# Patient Record
Sex: MALE | Race: WHITE | NOT HISPANIC OR LATINO | URBAN - METROPOLITAN AREA
[De-identification: names, ages, dates, MRNs, and addresses within clinical notes are randomized per-mention and may not be internally consistent; named-entity substitution may affect disease eponyms.]

---

## 2017-09-05 ENCOUNTER — OUTPATIENT (OUTPATIENT)
Dept: OUTPATIENT SERVICES | Facility: HOSPITAL | Age: 42
LOS: 1 days | End: 2017-09-05
Payer: OTHER MISCELLANEOUS

## 2017-09-05 VITALS
SYSTOLIC BLOOD PRESSURE: 140 MMHG | TEMPERATURE: 98 F | WEIGHT: 250 LBS | HEIGHT: 71 IN | RESPIRATION RATE: 16 BRPM | HEART RATE: 60 BPM | DIASTOLIC BLOOD PRESSURE: 100 MMHG

## 2017-09-05 DIAGNOSIS — G47.33 OBSTRUCTIVE SLEEP APNEA (ADULT) (PEDIATRIC): ICD-10-CM

## 2017-09-05 DIAGNOSIS — M25.9 JOINT DISORDER, UNSPECIFIED: Chronic | ICD-10-CM

## 2017-09-05 DIAGNOSIS — S83.249A OTHER TEAR OF MEDIAL MENISCUS, CURRENT INJURY, UNSPECIFIED KNEE, INITIAL ENCOUNTER: ICD-10-CM

## 2017-09-05 DIAGNOSIS — R03.0 ELEVATED BLOOD-PRESSURE READING, WITHOUT DIAGNOSIS OF HYPERTENSION: ICD-10-CM

## 2017-09-05 LAB
BUN SERPL-MCNC: 13 MG/DL — SIGNIFICANT CHANGE UP (ref 7–23)
CALCIUM SERPL-MCNC: 9.3 MG/DL — SIGNIFICANT CHANGE UP (ref 8.4–10.5)
CHLORIDE SERPL-SCNC: 102 MMOL/L — SIGNIFICANT CHANGE UP (ref 98–107)
CO2 SERPL-SCNC: 30 MMOL/L — SIGNIFICANT CHANGE UP (ref 22–31)
CREAT SERPL-MCNC: 0.87 MG/DL — SIGNIFICANT CHANGE UP (ref 0.5–1.3)
GLUCOSE SERPL-MCNC: 89 MG/DL — SIGNIFICANT CHANGE UP (ref 70–99)
HCT VFR BLD CALC: 44.2 % — SIGNIFICANT CHANGE UP (ref 39–50)
HGB BLD-MCNC: 14.7 G/DL — SIGNIFICANT CHANGE UP (ref 13–17)
MCHC RBC-ENTMCNC: 28.1 PG — SIGNIFICANT CHANGE UP (ref 27–34)
MCHC RBC-ENTMCNC: 33.3 % — SIGNIFICANT CHANGE UP (ref 32–36)
MCV RBC AUTO: 84.4 FL — SIGNIFICANT CHANGE UP (ref 80–100)
NRBC # FLD: 0 — SIGNIFICANT CHANGE UP
PLATELET # BLD AUTO: 171 K/UL — SIGNIFICANT CHANGE UP (ref 150–400)
PMV BLD: 12.3 FL — SIGNIFICANT CHANGE UP (ref 7–13)
POTASSIUM SERPL-MCNC: 4.5 MMOL/L — SIGNIFICANT CHANGE UP (ref 3.5–5.3)
POTASSIUM SERPL-SCNC: 4.5 MMOL/L — SIGNIFICANT CHANGE UP (ref 3.5–5.3)
RBC # BLD: 5.24 M/UL — SIGNIFICANT CHANGE UP (ref 4.2–5.8)
RBC # FLD: 12.7 % — SIGNIFICANT CHANGE UP (ref 10.3–14.5)
SODIUM SERPL-SCNC: 141 MMOL/L — SIGNIFICANT CHANGE UP (ref 135–145)
WBC # BLD: 5.96 K/UL — SIGNIFICANT CHANGE UP (ref 3.8–10.5)
WBC # FLD AUTO: 5.96 K/UL — SIGNIFICANT CHANGE UP (ref 3.8–10.5)

## 2017-09-05 PROCEDURE — 93010 ELECTROCARDIOGRAM REPORT: CPT

## 2017-09-05 NOTE — H&P PST ADULT - NEGATIVE ALLERGY TYPES
no outdoor environmental allergies/no indoor environmental allergies/no reactions to medicines/no reactions to animals/no reactions to food

## 2017-09-05 NOTE — H&P PST ADULT - HISTORY OF PRESENT ILLNESS
42yr 42yr old male with preop dx of left knee medial meniscus tear presents to have [facility] eval for Left knee arthroscopy scheduled on 9/12/2017. Patient states in July 2017 he tripped at work and twisted his leg and injured his left knee. Patient had MRI and xray of left knee and was seen by Dr Goins and then scheduled for the procedure.

## 2017-09-05 NOTE — H&P PST ADULT - NSANTHOSAYNRD_GEN_A_CORE
No. SHELBY screening performed.  STOP BANG Legend: 0-2 = LOW Risk; 3-4 = INTERMEDIATE Risk; 5-8 = HIGH Risk

## 2017-09-05 NOTE — H&P PST ADULT - PROBLEM SELECTOR PLAN 1
Scheduled for Left knee arthroscopy on 9/12/2017.  Preop instructions provided and pt verbalizes understanding.  Labs done and results pending.  Famotidine and Hibiclens provided with instructions.

## 2017-09-05 NOTE — H&P PST ADULT - PROBLEM SELECTOR PLAN 2
Patient presents with BP ranging from 144/100 to 140/100. Patient denies any headache, blurred vision, sob or chest discomfort. Comparison EKG requested and medical clearance requested. Patient presents with BP ranging from 144/100 to 140/100. Patient denies any headache, blurred vision, sob or chest discomfort. Cardiac clearance requested. Patient to see Dr Medina.

## 2017-09-12 ENCOUNTER — OUTPATIENT (OUTPATIENT)
Dept: OUTPATIENT SERVICES | Facility: HOSPITAL | Age: 42
LOS: 1 days | Discharge: HOME | End: 2017-09-12

## 2017-09-12 ENCOUNTER — TRANSCRIPTION ENCOUNTER (OUTPATIENT)
Age: 42
End: 2017-09-12

## 2017-09-12 VITALS
HEIGHT: 71 IN | SYSTOLIC BLOOD PRESSURE: 140 MMHG | HEART RATE: 60 BPM | TEMPERATURE: 98 F | RESPIRATION RATE: 16 BRPM | DIASTOLIC BLOOD PRESSURE: 100 MMHG | WEIGHT: 250 LBS

## 2017-09-12 VITALS
DIASTOLIC BLOOD PRESSURE: 87 MMHG | TEMPERATURE: 98 F | OXYGEN SATURATION: 99 % | RESPIRATION RATE: 16 BRPM | HEART RATE: 55 BPM | SYSTOLIC BLOOD PRESSURE: 142 MMHG

## 2017-09-12 DIAGNOSIS — S83.249A OTHER TEAR OF MEDIAL MENISCUS, CURRENT INJURY, UNSPECIFIED KNEE, INITIAL ENCOUNTER: ICD-10-CM

## 2017-09-12 DIAGNOSIS — M25.9 JOINT DISORDER, UNSPECIFIED: Chronic | ICD-10-CM

## 2017-09-12 RX ORDER — ONDANSETRON 8 MG/1
1 TABLET, FILM COATED ORAL
Qty: 12 | Refills: 0 | OUTPATIENT
Start: 2017-09-12 | End: 2017-09-15

## 2017-09-12 RX ORDER — ASPIRIN/CALCIUM CARB/MAGNESIUM 324 MG
1 TABLET ORAL
Qty: 30 | Refills: 0 | OUTPATIENT
Start: 2017-09-12 | End: 2017-10-12

## 2017-09-12 NOTE — ASU DISCHARGE PLAN (ADULT/PEDIATRIC). - NOTIFY
Bleeding that does not stop/Swelling that continues/Numbness, color, or temperature change to extremity/Pain not relieved by Medications/Fever greater than 101

## 2018-07-30 NOTE — ASU PREOPERATIVE ASSESSMENT, ADULT (IPARK ONLY) - TEACHING/LEARNING DEVELOPMENTAL CONSIDERATIONS
History of Present Illness
 
General
Chief Complaint: Chest Pain
Stated Complaint: "I THINK IM HAVING A HEART ATTACK"
Source: patient
Exam Limitations: no limitations
 
Vital Signs & Intake/Output
Vital Signs & Intake/Output
 Vital Signs
 
 
Date Time Temp Pulse Resp B/P B/P Pulse O2 O2 Flow FiO2
 
     Mean Ox Delivery Rate 
 
 2300 97.5 64 18 154/80  95 Room Air  
 
4 97.2 71  113/70     
 
4  72 17 100/59  95 Room Air  
 
2013  92       
 
 2005 96.8        
 
 1954  100 18 142/74  97 Room Air  
 
 
 ED Intake and Output
 
 
  0000  1200
 
Intake Total  
 
Output Total  
 
Balance  
 
   
 
Patient 295 lb 
 
Weight  
 
Weight Reported by Patient 
 
Measurement  
 
Method  
 
 
 
Allergies
Coded Allergies:
No Known Allergies (18)
 
Triage Nurses Notes Reviewed? yes
Onset: Gradual
Duration: week(s):
Timing: recent history
HPI:
38-year-old male presents emergency department complaining of intermittent 
episodes of dizziness, diaphoresis, presyncope, dyspnea, nausea with racing 
heart sensation for the past few weeks.  Patient has had these episodes was last
a few seconds and are minimally however today he had an episode that lasted for 
about one hour.  Patient had seen his primary care doctor for these episodes and
was scheduled to have a Holter monitor testing this week. He has not seen a 
cardiologist yet.  Patient denies chest pain, cough, hemoptysis, recent travel.
(Bozena Mar)
Reconcile Medications
Buprenorphine HCl/Naloxone HCl (Buprenorphin-Naloxon 8-2 MG Sl) (Unknown 
Strength) TAB.SUBL   (Unknown Dose) OPIATE WITHDRAWAL  (Reported)
 
(Matheus RODRIGUEZ,Hunter LAMBERT)
 
Past History
 
Medical History
Any Pertinent Medical History? none
 
Surgical History
Surgical History: non-contributory
 
Family History
Hx Contributory? No
(Bozena Mar)
 
Review of Systems
 
Review of Systems
Constitutional:
Reports: see HPI. 
EENTM:
Reports: no symptoms. 
Respiratory:
Reports: see HPI. 
Cardiovascular:
Reports: see HPI. 
GI:
Reports: see HPI. 
Genitourinary:
Reports: no symptoms. 
Musculoskeletal:
Reports: no symptoms. 
Skin:
Reports: no symptoms. 
Neurological/Psychological:
Reports: see HPI. 
Hematologic/Endocrine:
Reports: no symptoms. 
Immunologic/Allergic:
Reports: no symptoms. 
All Other Systems: Reviewed and Negative
(Bozena Marle)
 
Physical Exam
 
Physical Exam
General Appearance: well developed/nourished, no apparent distress, alert, awake
Head: atraumatic, normal appearance
Eyes:
Bilateral: normal appearance. 
Ears, Nose, Throat: hearing grossly normal
Neck: normal inspection, supple, full range of motion
Respiratory: normal breath sounds, no respiratory distress, lungs clear
Cardiovascular: tachycardia
Peripheral Pulses:
2+ radial (R), 2+ radial (L)
Gastrointestinal: normal bowel sounds, soft, non-tender, no organomegaly
Back: normal inspection, normal range of motion
Extremities: normal inspection, normal range of motion
Neurologic/Psych: awake, alert, oriented x 3
Skin: intact, normal color, warm/dry
 
Core Measures
ACS in differential dx? Yes
CVA/TIA Diagnosis No
Sepsis Present: No
Sepsis Focused Exam Completed? No
(Alondra WARE,Bozena Uribe)
 
Progress
Differential Diagnosis: AMI, atrial fibrillation, CHF/pulm edema, 
hyperventilation, myocarditis, pericarditis, pneumonia, PSVT, pulmonary embolism
, unstable angina
Plan of Care:
 Orders
 
 
Procedure Date/time Status
 
Nothing by Mouth  B Active
 
ECHOCARDIOGRAM  0800 Active
 
MAGNESIUM  06 Active
 
LIPID PANEL  06 Active
 
GLYCOSYLATED HGB  06 Active
 
CBC WITHOUT DIFFERENTIAL  06 Active
 
BASIC ELECTROLYTES PLUS BUN&CR  06 Active
 
Pathway - chart  0230 Active
 
House Staff  0230 Active
 
VTE Mechanical Prophylaxis   UNK Active
 
Activity/Ambulation   UNK Active
 
Patient Data  2303 Active
 
Saline Lock  2213 Active
 
Place in observation  2213 Active
 
Misc Message  2213 Active
 
ED Holding Orders  2213 Active
 
Vital Signs  2213 Active
 
Code Status  2213 Active
 
Intake & Output  2013 Active
 
THYROID STIMULATING HORMONE 1947 Complete
 
FREE T4 1947 Complete
 
D-DIMER 1947 Complete
 
PARTIAL THROMBOPLASTIN TIME 1943 Complete
 
PROTHROMBIN TIME 1943 Complete
 
Telemetry/Cardiac Monitor 1917 Active
 
URINE DRUG SCREEN FOR ER ONLY 1917 Complete
 
TROPONIN LEVEL 1917 Complete
 
MAGNESIUM 1917 Complete
 
COMPREHENSIVE METABOLIC PANEL 1917 Complete
 
CBC WITHOUT DIFFERENTIAL 1917 Complete
 
EKG 071901 Active
 
 
 Current Medications
 
 
  Sig/Namrata Start time  Last
 
Medication Dose  Stop Time Status Admin
 
Enoxaparin Sodium 40 MG DAILY 900 AC 
 
(Lovenox)     
 
Metoprolol Tartrate 25 MG  AC 
 
(Lopressor)     
 
Acetaminophen 650 MG Q6P PRN  0230 AC 
 
(Tylenol)     
 
 
 Laboratory Tests
 
 
 
18:
Urine Opiates Screen < 100, Methadone Screen < 40, Barbiturate Screen < 60, Ur 
Phencyclidine Scrn < 6.00, Amphetamines Screen < 100, U Benzodiazepines Scrn < 
85, Urine Cocaine Screen < 50, Urine Cannabis Screen < 5.00
 
18:
Anion Gap 12, Estimated GFR > 60, BUN/Creatinine Ratio 27.1  H, Glucose 95, 
Calcium 9.5, Magnesium 1.8, Total Bilirubin 0.6, AST 73  H, ALT 87  H, Alkaline 
Phosphatase 97, Troponin I < 0.01, Total Protein 7.7, Albumin 4.5, Globulin 3.2,
Albumin/Globulin Ratio 1.4, TSH 2.730, Free T4 1.35, PT 10.7, INR 0.98, APTT 28,
D-Dimer High Sensitivty < 200, CBC w Diff NO MAN DIFF REQ, RBC 4.90, MCV 82.9, 
MCH 27.6, MCHC 33.3, RDW 15.5  H, MPV 8.3, Gran % 55.4, Lymphocytes % 35.3, 
Monocytes % 4.6, Eosinophils % 2.8, Basophils % 1.9, Absolute Granulocytes 5.0, 
Absolute Lymphocytes 3.2, Absolute Monocytes 0.4, Absolute Eosinophils 0.3, 
Absolute Basophils 0.2
 
18:
TSH Cancelled, Free T4 Cancelled
 
18:
D-Dimer High Sensitivty Cancelled
 
 
Patient had episode of SVT on cardiac monitor.  Dr. Jarvis saw and evaluated 
the patient at which and the patient had a CT on the monitor experienced 
dizziness and presyncope.  SVT rhythm resolved on its own.  Patient given IV and
oral low pressure and has had good rate control since medication administration.
Given findings of new SVT telemetry monitoring and cardiology consult is 
appropriate. Case management approve obs. Dr. Jarvis spoke with Dr. Audra woody tele obs.
Diagnostic Imaging:
Viewed by Me: Radiology Read.  Discussed w/RAD: Radiology Read. 
CXR Impression: PATIENT: IRMA GILMORE  MEDICAL RECORD NO: [id number] PRESENT AGE: 38 
PATIENT ACCOUNT NO: [id number] : 78  LOCATION: [facility] ORDERING PHYSICIAN: 
Bozena WARE     SERVICE DATE:  EXAM TYPE: RAD - XRY-CHEST 
XRAY, TWO VIEWS EXAMINATION: XR CHEST CLINICAL INFORMATION: Chest pain . 
COMPARISON: None TECHNIQUE: 2 views of the chest were obtained. FINDINGS: No 
focal consolidation, pleural effusion or pneumothorax. Heart size is normal. No 
acute osseous abnormality. IMPRESSION: No acute cardiopulmonary process. 
DICTATED BY: Faye Silva MD  DATE/TIME DICTATED:18 
:RAD.RAMIREZ  DATE/TIME TRANSCRIBED:18 CONFIDENTIAL, DO NOT COPY 
WITHOUT APPROPRIATE AUTHORIZATION.  <Electronically signed in Other Vendor 
System>                                                                         
              SIGNED BY: Faye Silva MD 18
Initial ED EKG: SINUS TACHYCARDIA @114BPM, S1Q3T3, NONSEPCIFIC ST CHANGES
(Bozena Mar)
 
Departure
 
Departure
Disposition: STILL A PATIENT
Condition: Stable
Clinical Impression
Primary Impression: SVT (supraventricular tachycardia)
Secondary Impressions: Acute electrocardiogram changes
Referrals:
Ángel Lopes MD
 
Departure Forms:
Customer Survey
General Discharge Information
(Bozena Mar)
 
Observation Note
Spoke With:
Audra RODRIGUEZ,[facility] Patient In: Non-ED OBS Care Area
Rationale for Observation:
My rational for observation is as follows .
 
pt with dizziness, runs of svt, merits med management, monitoring, cards eval in
AM. 
 
 
PA/NP Co-Sign Statement
Statement:
ED Attending supervision documentation-
 
[x] I saw and evaluated the patient. I have also reviewed all the pertinent lab 
results and diagnostic results. I agree with the findings and the plan of care 
as documented in the PA's/NP's documentation. 
 
18, 7:53... PT WITH episode of svt, with recurrent episodes of dizziness/
pre-syncope at home... merits med management, monitoring.  
 
[] I have reviewed the ED Record and agree with the PA's/NP's documentation.
 
[] Additions or exceptions (if any) to the PAs/NP's note and plan are 
summarized below:
[]
 
(Matheus RODRIGUEZ,Hunter LAMBERT)
 
Critical Care Note
 
Critical Care Note
Critical Care Time: non-applicable
(Alondra WARE,Bozena Uribe)
none

## 2019-11-12 ENCOUNTER — APPOINTMENT (OUTPATIENT)
Dept: INTERNAL MEDICINE | Facility: CLINIC | Age: 44
End: 2019-11-12
Payer: COMMERCIAL

## 2019-11-12 VITALS
TEMPERATURE: 98 F | HEART RATE: 58 BPM | SYSTOLIC BLOOD PRESSURE: 120 MMHG | WEIGHT: 230 LBS | DIASTOLIC BLOOD PRESSURE: 80 MMHG | HEIGHT: 72 IN | BODY MASS INDEX: 31.15 KG/M2 | OXYGEN SATURATION: 98 %

## 2019-11-12 DIAGNOSIS — Z78.9 OTHER SPECIFIED HEALTH STATUS: ICD-10-CM

## 2019-11-12 DIAGNOSIS — M25.511 PAIN IN RIGHT SHOULDER: ICD-10-CM

## 2019-11-12 DIAGNOSIS — M25.512 PAIN IN RIGHT SHOULDER: ICD-10-CM

## 2019-11-12 DIAGNOSIS — M25.522 PAIN IN LEFT ELBOW: ICD-10-CM

## 2019-11-12 PROCEDURE — 99204 OFFICE O/P NEW MOD 45 MIN: CPT

## 2019-11-12 NOTE — PLAN
[FreeTextEntry1] : Bilateral shoulder pain likely rotator cuff tendonitis, also with left elbow pain suspected from working out at [employer]. Start PT. Use NSAID's and heat therapy.\par \par Low testosterone level last month although labs drawn in afternoon. Recheck AM testosterone level, Rx given to patient and will go to nearby [facility] lab.\par

## 2019-11-12 NOTE — PLAN
[FreeTextEntry1] : Bilateral shoulder pain likely rotator cuff tendonitis, also with left elbow pain suspected from working out at [employer]. Start PT. Use NSAID's and heat therapy.\par \par Low testosterone level last month although labs drawn in afternoon. Recheck AM testosterone level, Rx given to patient and will go to nearby [facility] lab.\par

## 2019-11-12 NOTE — HISTORY OF PRESENT ILLNESS
[FreeTextEntry8] : 44 year old M comes to establish medical care. Previously seen by Dr. Banda in 2013.\par \par He complains of bilateral shoulder pain when raising up arms x 3 weeks. Also with left elbow pain worse with extending arm. He thinks the pain was caused from working out at [employer] 5-6 days a week. He takes an occasional Advil. He would like to go to PT.    \par \par He saw an internist (Dr. Leonid Wiley) in [place] last month for a physical and blood work. Labs revealed low testosterone but blood work was obtained in late afternoon.

## 2019-11-12 NOTE — HISTORY OF PRESENT ILLNESS
[FreeTextEntry8] : 44 year old M comes to establish medical care. Previously seen by Dr. Banda in 2013.\par \par He complains of bilateral shoulder pain when raising up arms x 3 weeks. Also with left elbow pain worse with extending arm. He thinks the pain was caused from working out at [employer] 5-6 days a week. He takes an occasional Advil. He would like to go to PT.    \par \par He saw an internist (Dr. Leonid Wiley) in [place] last month for a physical and blood work. Labs revealed low testosterone but blood work was obtained in late afternoon.

## 2019-11-12 NOTE — PHYSICAL EXAM
[No Edema] : there was no peripheral edema [Normal] : no CVA or spinal tenderness [de-identified] : b/l shoulder tenderness with raising up arms, left elbow mildly tender but no effusion

## 2019-11-12 NOTE — PHYSICAL EXAM
[No Edema] : there was no peripheral edema [Normal] : affect was normal and insight and judgment were intact [de-identified] : b/l shoulder tenderness with raising up arms, left elbow mildly tender but no effusion

## 2019-11-22 ENCOUNTER — TRANSCRIPTION ENCOUNTER (OUTPATIENT)
Age: 44
End: 2019-11-22

## 2019-11-26 ENCOUNTER — TRANSCRIPTION ENCOUNTER (OUTPATIENT)
Age: 44
End: 2019-11-26

## 2019-11-26 LAB
TESTOST BND SERPL-MCNC: 32.2 PG/ML
TESTOST SERPL-MCNC: 512.8 NG/DL

## 2019-11-27 ENCOUNTER — TRANSCRIPTION ENCOUNTER (OUTPATIENT)
Age: 44
End: 2019-11-27

## 2019-12-13 ENCOUNTER — TRANSCRIPTION ENCOUNTER (OUTPATIENT)
Age: 44
End: 2019-12-13

## 2019-12-16 ENCOUNTER — TRANSCRIPTION ENCOUNTER (OUTPATIENT)
Age: 44
End: 2019-12-16

## 2019-12-16 ENCOUNTER — CHART COPY (OUTPATIENT)
Age: 44
End: 2019-12-16

## 2019-12-16 DIAGNOSIS — M25.511 PAIN IN RIGHT SHOULDER: ICD-10-CM

## 2019-12-17 ENCOUNTER — TRANSCRIPTION ENCOUNTER (OUTPATIENT)
Age: 44
End: 2019-12-17

## 2019-12-18 ENCOUNTER — APPOINTMENT (OUTPATIENT)
Dept: INTERNAL MEDICINE | Facility: CLINIC | Age: 44
End: 2019-12-18
Payer: COMMERCIAL

## 2019-12-18 VITALS
HEIGHT: 72 IN | SYSTOLIC BLOOD PRESSURE: 120 MMHG | DIASTOLIC BLOOD PRESSURE: 78 MMHG | BODY MASS INDEX: 31.15 KG/M2 | WEIGHT: 230 LBS | HEART RATE: 60 BPM | OXYGEN SATURATION: 98 % | TEMPERATURE: 98.3 F

## 2019-12-18 DIAGNOSIS — H69.82 OTHER SPECIFIED DISORDERS OF EUSTACHIAN TUBE, LEFT EAR: ICD-10-CM

## 2019-12-18 PROCEDURE — 99213 OFFICE O/P EST LOW 20 MIN: CPT

## 2019-12-18 NOTE — PHYSICAL EXAM
[Normal Oropharynx] : the oropharynx was normal [No Lymphadenopathy] : no lymphadenopathy [Supple] : supple [Normal] : normal rate, regular rhythm, normal S1 and S2 and no murmur heard [de-identified] : left TM: mild erythema, no cerumen or discharge [No Edema] : there was no peripheral edema

## 2019-12-18 NOTE — HISTORY OF PRESENT ILLNESS
[FreeTextEntry8] : Patient comes for an acute visit. \par \par He is here for evaluation of left ear ache with clogged sensation x 4 days. Has hearing impairment on left. Feels under water. No nasal congestion. No recent swimming. No fever or chills. He has mild cough. Does not feel ill. \par

## 2019-12-18 NOTE — PLAN
[FreeTextEntry1] : Suspect left ETD. Doubtful of ear infection.\par Start Flonase for 5-7 days.\par Consider Medrol dose pack if unrelieved with Flonase.\par Call office PRN.

## 2019-12-26 ENCOUNTER — FORM ENCOUNTER (OUTPATIENT)
Age: 44
End: 2019-12-26

## 2019-12-27 ENCOUNTER — APPOINTMENT (OUTPATIENT)
Dept: MRI IMAGING | Facility: CLINIC | Age: 44
End: 2019-12-27
Payer: COMMERCIAL

## 2019-12-27 ENCOUNTER — OUTPATIENT (OUTPATIENT)
Dept: OUTPATIENT SERVICES | Facility: HOSPITAL | Age: 44
LOS: 1 days | End: 2019-12-27
Payer: COMMERCIAL

## 2019-12-27 DIAGNOSIS — M25.511 PAIN IN RIGHT SHOULDER: ICD-10-CM

## 2019-12-27 DIAGNOSIS — M25.9 JOINT DISORDER, UNSPECIFIED: Chronic | ICD-10-CM

## 2019-12-27 PROCEDURE — 73221 MRI JOINT UPR EXTREM W/O DYE: CPT | Mod: 26,RT

## 2019-12-27 PROCEDURE — 73221 MRI JOINT UPR EXTREM W/O DYE: CPT

## 2019-12-27 PROCEDURE — 70200 X-RAY EXAM OF EYE SOCKETS: CPT | Mod: 26

## 2019-12-27 PROCEDURE — 70200 X-RAY EXAM OF EYE SOCKETS: CPT

## 2019-12-31 ENCOUNTER — TRANSCRIPTION ENCOUNTER (OUTPATIENT)
Age: 44
End: 2019-12-31

## 2021-12-22 ENCOUNTER — APPOINTMENT (OUTPATIENT)
Dept: INTERNAL MEDICINE | Facility: CLINIC | Age: 46
End: 2021-12-22
Payer: COMMERCIAL

## 2021-12-22 VITALS
SYSTOLIC BLOOD PRESSURE: 114 MMHG | HEIGHT: 72 IN | DIASTOLIC BLOOD PRESSURE: 90 MMHG | BODY MASS INDEX: 32.51 KG/M2 | HEART RATE: 74 BPM | WEIGHT: 240 LBS | OXYGEN SATURATION: 98 % | TEMPERATURE: 98.7 F

## 2021-12-22 VITALS — DIASTOLIC BLOOD PRESSURE: 72 MMHG | SYSTOLIC BLOOD PRESSURE: 110 MMHG

## 2021-12-22 DIAGNOSIS — Z12.11 ENCOUNTER FOR SCREENING FOR MALIGNANT NEOPLASM OF COLON: ICD-10-CM

## 2021-12-22 DIAGNOSIS — Z82.0 FAMILY HISTORY OF EPILEPSY AND OTHER DISEASES OF THE NERVOUS SYSTEM: ICD-10-CM

## 2021-12-22 DIAGNOSIS — R79.89 OTHER SPECIFIED ABNORMAL FINDINGS OF BLOOD CHEMISTRY: ICD-10-CM

## 2021-12-22 DIAGNOSIS — U07.1 COVID-19: ICD-10-CM

## 2021-12-22 DIAGNOSIS — Z01.84 ENCOUNTER FOR ANTIBODY RESPONSE EXAMINATION: ICD-10-CM

## 2021-12-22 PROCEDURE — 99396 PREV VISIT EST AGE 40-64: CPT | Mod: 25

## 2021-12-22 PROCEDURE — 36415 COLL VENOUS BLD VENIPUNCTURE: CPT

## 2021-12-22 RX ORDER — FLUTICASONE PROPIONATE 50 UG/1
50 SPRAY, METERED NASAL
Qty: 1 | Refills: 1 | Status: DISCONTINUED | COMMUNITY
Start: 2019-12-18 | End: 2021-12-22

## 2021-12-22 NOTE — PLAN
[FreeTextEntry1] : Check routine fasting labs.\par Discussed diet, exercise, and weight maintenance.\par FIT ordered for colon cancer screening.\par Prostate cancer screening with PSA.\par Diagnosed COVID earlier this month s/p monoclonal Ab infusion. He is not interested in vaccination. Check nucleocapsid antibody level.\par History of low testosterone - repeat level.\par \par RTO in 1 yr for annual exam or earlier PRN for acute care.\par

## 2021-12-22 NOTE — PHYSICAL EXAM
[No Acute Distress] : no acute distress [Well Nourished] : well nourished [Well Developed] : well developed [Well-Appearing] : well-appearing [Normal Sclera/Conjunctiva] : normal sclera/conjunctiva [PERRL] : pupils equal round and reactive to light [EOMI] : extraocular movements intact [Normal Outer Ear/Nose] : the outer ears and nose were normal in appearance [Normal Oropharynx] : the oropharynx was normal [Normal TMs] : both tympanic membranes were normal [No JVD] : no jugular venous distention [No Lymphadenopathy] : no lymphadenopathy [Supple] : supple [Thyroid Normal, No Nodules] : the thyroid was normal and there were no nodules present [No Respiratory Distress] : no respiratory distress  [No Accessory Muscle Use] : no accessory muscle use [Clear to Auscultation] : lungs were clear to auscultation bilaterally [Normal Rate] : normal rate  [Regular Rhythm] : with a regular rhythm [Normal S1, S2] : normal S1 and S2 [No Murmur] : no murmur heard [No Carotid Bruits] : no carotid bruits [No Varicosities] : no varicosities [Pedal Pulses Present] : the pedal pulses are present [No Edema] : there was no peripheral edema [Soft] : abdomen soft [Non Tender] : non-tender [Non-distended] : non-distended [No Masses] : no abdominal mass palpated [No HSM] : no HSM [Normal Bowel Sounds] : normal bowel sounds [Normal Posterior Cervical Nodes] : no posterior cervical lymphadenopathy [Normal Anterior Cervical Nodes] : no anterior cervical lymphadenopathy [No CVA Tenderness] : no CVA  tenderness [No Spinal Tenderness] : no spinal tenderness [No Joint Swelling] : no joint swelling [Grossly Normal Strength/Tone] : grossly normal strength/tone [No Rash] : no rash [Coordination Grossly Intact] : coordination grossly intact [No Focal Deficits] : no focal deficits [Normal Gait] : normal gait [Deep Tendon Reflexes (DTR)] : deep tendon reflexes were 2+ and symmetric [Normal Affect] : the affect was normal [Normal Insight/Judgement] : insight and judgment were intact [Normal Mood] : the mood was normal [de-identified] : friendly, appears well

## 2021-12-22 NOTE — HISTORY OF PRESENT ILLNESS
[FreeTextEntry1] : physical [de-identified] : Patient comes for an annual exam. Last seen in 12/2019.\par \par He was positive for COVID on 12/9 at urgent care ([facility]). He then received monoclonal antibody infusion at [facility] few days later. Symptoms were mild, mostly fatigue, dyspnea, cough, and congestion. He had repeat COVID test which came back negative last week. He feels better but still has mild dyspnea with exertion. \par \par

## 2021-12-22 NOTE — HEALTH RISK ASSESSMENT
[Excellent] : ~his/her~  mood as  excellent [Never] : Never [No] : In the past 12 months have you used drugs other than those required for medical reasons? No [No falls in past year] : Patient reported no falls in the past year [0] : 2) Feeling down, depressed, or hopeless: Not at all (0) [With Family] : lives with family [Employed] : employed [] :  [# Of Children ___] : has [unfilled] children [Sexually Active] : sexually active [Smoke Detector] : smoke detector [Carbon Monoxide Detector] : carbon monoxide detector [Seat Belt] :  uses seat belt [Sunscreen] : uses sunscreen [[id number]] : 0 [High Risk Behavior] : no high risk behavior [Reports changes in hearing] : Reports no changes in hearing [Reports changes in vision] : Reports no changes in vision [Reports changes in dental health] : Reports no changes in dental health

## 2021-12-25 ENCOUNTER — TRANSCRIPTION ENCOUNTER (OUTPATIENT)
Age: 46
End: 2021-12-25

## 2021-12-25 LAB
ALBUMIN SERPL ELPH-MCNC: 4.6 G/DL
ALP BLD-CCNC: 66 U/L
ALT SERPL-CCNC: 40 U/L
ANION GAP SERPL CALC-SCNC: 16 MMOL/L
AST SERPL-CCNC: 24 U/L
BASOPHILS # BLD AUTO: 0.04 K/UL
BASOPHILS NFR BLD AUTO: 0.7 %
BILIRUB SERPL-MCNC: 0.4 MG/DL
BUN SERPL-MCNC: 13 MG/DL
CALCIUM SERPL-MCNC: 10.1 MG/DL
CHLORIDE SERPL-SCNC: 104 MMOL/L
CHOLEST SERPL-MCNC: 171 MG/DL
CO2 SERPL-SCNC: 23 MMOL/L
COVID-19 NUCLEOCAPSID  GAM ANTIBODY INTERPRETATION: POSITIVE
CREAT SERPL-MCNC: 0.98 MG/DL
EOSINOPHIL # BLD AUTO: 0.12 K/UL
EOSINOPHIL NFR BLD AUTO: 2 %
ESTIMATED AVERAGE GLUCOSE: 117 MG/DL
GLUCOSE SERPL-MCNC: 103 MG/DL
HBA1C MFR BLD HPLC: 5.7 %
HCT VFR BLD CALC: 41.9 %
HDLC SERPL-MCNC: 33 MG/DL
HGB BLD-MCNC: 14.3 G/DL
IMM GRANULOCYTES NFR BLD AUTO: 0.3 %
LDLC SERPL CALC-MCNC: 108 MG/DL
LYMPHOCYTES # BLD AUTO: 1.34 K/UL
LYMPHOCYTES NFR BLD AUTO: 22.3 %
MAN DIFF?: NORMAL
MCHC RBC-ENTMCNC: 29.9 PG
MCHC RBC-ENTMCNC: 34.1 GM/DL
MCV RBC AUTO: 87.5 FL
MONOCYTES # BLD AUTO: 0.31 K/UL
MONOCYTES NFR BLD AUTO: 5.1 %
NEUTROPHILS # BLD AUTO: 4.19 K/UL
NEUTROPHILS NFR BLD AUTO: 69.6 %
NONHDLC SERPL-MCNC: 137 MG/DL
PLATELET # BLD AUTO: 416 K/UL
POTASSIUM SERPL-SCNC: 5.3 MMOL/L
PROT SERPL-MCNC: 7.3 G/DL
PSA SERPL-MCNC: 0.73 NG/ML
RBC # BLD: 4.79 M/UL
RBC # FLD: 12.7 %
SARS-COV-2 AB SERPL QL IA: 71.4 INDEX
SODIUM SERPL-SCNC: 143 MMOL/L
TESTOST FREE SERPL-MCNC: 21.3 PG/ML
TESTOST SERPL-MCNC: 451 NG/DL
TRIGL SERPL-MCNC: 149 MG/DL
TSH SERPL-ACNC: 1.46 UIU/ML
VIT B12 SERPL-MCNC: 841 PG/ML
WBC # FLD AUTO: 6.02 K/UL

## 2021-12-28 ENCOUNTER — TRANSCRIPTION ENCOUNTER (OUTPATIENT)
Age: 46
End: 2021-12-28

## 2022-01-01 ENCOUNTER — TRANSCRIPTION ENCOUNTER (OUTPATIENT)
Age: 47
End: 2022-01-01

## 2022-01-01 LAB — HEMOCCULT STL QL IA: POSITIVE

## 2023-04-23 PROBLEM — R19.5 POSITIVE FIT (FECAL IMMUNOCHEMICAL TEST): Status: ACTIVE | Noted: 2022-01-01

## 2023-04-23 PROBLEM — R73.03 BORDERLINE DIABETES: Status: ACTIVE | Noted: 2021-12-25

## 2023-04-24 ENCOUNTER — APPOINTMENT (OUTPATIENT)
Dept: INTERNAL MEDICINE | Facility: CLINIC | Age: 48
End: 2023-04-24
Payer: COMMERCIAL

## 2023-04-24 ENCOUNTER — LABORATORY RESULT (OUTPATIENT)
Age: 48
End: 2023-04-24

## 2023-04-24 ENCOUNTER — NON-APPOINTMENT (OUTPATIENT)
Age: 48
End: 2023-04-24

## 2023-04-24 VITALS
DIASTOLIC BLOOD PRESSURE: 86 MMHG | BODY MASS INDEX: 34.02 KG/M2 | OXYGEN SATURATION: 97 % | SYSTOLIC BLOOD PRESSURE: 120 MMHG | HEIGHT: 71 IN | HEART RATE: 65 BPM | TEMPERATURE: 97.4 F | WEIGHT: 243 LBS

## 2023-04-24 DIAGNOSIS — I44.0 ATRIOVENTRICULAR BLOCK, FIRST DEGREE: ICD-10-CM

## 2023-04-24 DIAGNOSIS — R73.03 PREDIABETES.: ICD-10-CM

## 2023-04-24 DIAGNOSIS — Z01.83 ENCOUNTER FOR BLOOD TYPING: ICD-10-CM

## 2023-04-24 DIAGNOSIS — R19.5 OTHER FECAL ABNORMALITIES: ICD-10-CM

## 2023-04-24 DIAGNOSIS — Z00.00 ENCOUNTER FOR GENERAL ADULT MEDICAL EXAMINATION W/OUT ABNORMAL FINDINGS: ICD-10-CM

## 2023-04-24 PROCEDURE — 36415 COLL VENOUS BLD VENIPUNCTURE: CPT

## 2023-04-24 PROCEDURE — 99396 PREV VISIT EST AGE 40-64: CPT | Mod: 25

## 2023-04-24 PROCEDURE — 93000 ELECTROCARDIOGRAM COMPLETE: CPT

## 2023-04-24 NOTE — DATA REVIEWED
[FreeTextEntry1] : EKG: sinus bradycardia @ 56, early repolarization, 1st degree AV block; no prior studies for comparison\par

## 2023-04-24 NOTE — HEALTH RISK ASSESSMENT
[Excellent] : ~his/her~  mood as  excellent [No] : In the past 12 months have you used drugs other than those required for medical reasons? No [No falls in past year] : Patient reported no falls in the past year [0] : 2) Feeling down, depressed, or hopeless: Not at all (0) [Patient declined colonoscopy] : Patient declined colonoscopy [With Family] : lives with family [Employed] : employed [] :  [# Of Children ___] : has [unfilled] children [Sexually Active] : sexually active [Smoke Detector] : smoke detector [Carbon Monoxide Detector] : carbon monoxide detector [Seat Belt] :  uses seat belt [Sunscreen] : uses sunscreen [Never] : Never [de-identified] : [employer] 4-5x a week [[id number]] : 0 [High Risk Behavior] : no high risk behavior [Reports changes in hearing] : Reports no changes in hearing [Reports changes in vision] : Reports no changes in vision [Reports changes in dental health] : Reports no changes in dental health [ColonoscopyComments] : FIT 12/21 - positive, never went to GI

## 2023-04-24 NOTE — PHYSICAL EXAM
[No Acute Distress] : no acute distress [Well Nourished] : well nourished [Well Developed] : well developed [Well-Appearing] : well-appearing [Normal Sclera/Conjunctiva] : normal sclera/conjunctiva [PERRL] : pupils equal round and reactive to light [EOMI] : extraocular movements intact [Normal Outer Ear/Nose] : the outer ears and nose were normal in appearance [Normal Oropharynx] : the oropharynx was normal [Normal TMs] : both tympanic membranes were normal [No JVD] : no jugular venous distention [No Lymphadenopathy] : no lymphadenopathy [Supple] : supple [Thyroid Normal, No Nodules] : the thyroid was normal and there were no nodules present [No Respiratory Distress] : no respiratory distress  [No Accessory Muscle Use] : no accessory muscle use [Clear to Auscultation] : lungs were clear to auscultation bilaterally [Normal Rate] : normal rate  [Regular Rhythm] : with a regular rhythm [Normal S1, S2] : normal S1 and S2 [No Murmur] : no murmur heard [No Carotid Bruits] : no carotid bruits [No Varicosities] : no varicosities [Pedal Pulses Present] : the pedal pulses are present [No Edema] : there was no peripheral edema [Soft] : abdomen soft [Non Tender] : non-tender [Non-distended] : non-distended [No Masses] : no abdominal mass palpated [No HSM] : no HSM [Normal Bowel Sounds] : normal bowel sounds [No CVA Tenderness] : no CVA  tenderness [No Spinal Tenderness] : no spinal tenderness [No Joint Swelling] : no joint swelling [Grossly Normal Strength/Tone] : grossly normal strength/tone [No Rash] : no rash [Coordination Grossly Intact] : coordination grossly intact [No Focal Deficits] : no focal deficits [Normal Gait] : normal gait [Deep Tendon Reflexes (DTR)] : deep tendon reflexes were 2+ and symmetric [Normal Affect] : the affect was normal [Normal Mood] : the mood was normal [Normal Insight/Judgement] : insight and judgment were intact [Multiple Tattoos] : multiple tattoos observed [de-identified] : friendly

## 2023-04-24 NOTE — HISTORY OF PRESENT ILLNESS
[FreeTextEntry1] : physical [de-identified] : Patient comes for an annual exam. Last seen in 12/21.\par \par He reports feeling well, has no complaints.\par He would like testosterone level rechecked. \par Had positive FIT in 12/21, was told to see GI for colonoscopy but he never went. He would like FIT rechecked today.\par

## 2023-04-24 NOTE — PLAN
[FreeTextEntry1] : Check routine fasting labs.\par EKG - 1st degree AVB, asymptomatic.\par Discussed diet, exercise, and weight maintenance.\par Recheck FIT for colon cancer screening - was positive in 12/21. If again positive, will send to GI for colonoscopy.\par Prostate cancer screening with PSA.\par Borderline DM - check A1c. Watch sweets. Follows excellent diet and exercise regimen.\par History of low testosterone - repeat level.\par \par RTO in 1 yr for annual exam or earlier PRN for acute care.\par

## 2023-04-25 ENCOUNTER — TRANSCRIPTION ENCOUNTER (OUTPATIENT)
Age: 48
End: 2023-04-25

## 2023-04-25 LAB
ABO + RH PNL BLD: NORMAL
ALBUMIN SERPL ELPH-MCNC: 4.7 G/DL
ALP BLD-CCNC: 68 U/L
ALT SERPL-CCNC: 23 U/L
ANION GAP SERPL CALC-SCNC: 13 MMOL/L
AST SERPL-CCNC: 19 U/L
BASOPHILS # BLD AUTO: 0.05 K/UL
BASOPHILS NFR BLD AUTO: 1 %
BILIRUB SERPL-MCNC: 0.4 MG/DL
BUN SERPL-MCNC: 19 MG/DL
CALCIUM SERPL-MCNC: 9.7 MG/DL
CHLORIDE SERPL-SCNC: 105 MMOL/L
CHOLEST SERPL-MCNC: 194 MG/DL
CO2 SERPL-SCNC: 23 MMOL/L
CREAT SERPL-MCNC: 0.91 MG/DL
EGFR: 104 ML/MIN/1.73M2
EOSINOPHIL # BLD AUTO: 0.15 K/UL
EOSINOPHIL NFR BLD AUTO: 3 %
ESTIMATED AVERAGE GLUCOSE: 111 MG/DL
GLUCOSE SERPL-MCNC: 116 MG/DL
HBA1C MFR BLD HPLC: 5.5 %
HCT VFR BLD CALC: 48.5 %
HDLC SERPL-MCNC: 55 MG/DL
HGB BLD-MCNC: 15.7 G/DL
IMM GRANULOCYTES NFR BLD AUTO: 0.2 %
LDLC SERPL CALC-MCNC: 124 MG/DL
LYMPHOCYTES # BLD AUTO: 1.45 K/UL
LYMPHOCYTES NFR BLD AUTO: 28.7 %
MAN DIFF?: NORMAL
MCHC RBC-ENTMCNC: 28.9 PG
MCHC RBC-ENTMCNC: 32.4 GM/DL
MCV RBC AUTO: 89.2 FL
MONOCYTES # BLD AUTO: 0.3 K/UL
MONOCYTES NFR BLD AUTO: 5.9 %
NEUTROPHILS # BLD AUTO: 3.09 K/UL
NEUTROPHILS NFR BLD AUTO: 61.2 %
NONHDLC SERPL-MCNC: 140 MG/DL
PLATELET # BLD AUTO: 170 K/UL
POTASSIUM SERPL-SCNC: 4.8 MMOL/L
PROT SERPL-MCNC: 7 G/DL
PSA SERPL-MCNC: 0.76 NG/ML
RBC # BLD: 5.44 M/UL
RBC # FLD: 13.2 %
SODIUM SERPL-SCNC: 141 MMOL/L
TESTOST SERPL-MCNC: 509 NG/DL
TRIGL SERPL-MCNC: 81 MG/DL
TSH SERPL-ACNC: 1.75 UIU/ML
VIT B12 SERPL-MCNC: 518 PG/ML
WBC # FLD AUTO: 5.05 K/UL

## 2023-04-26 ENCOUNTER — TRANSCRIPTION ENCOUNTER (OUTPATIENT)
Age: 48
End: 2023-04-26

## 2023-04-28 LAB — HEMOCCULT STL QL IA: NEGATIVE

## 2023-05-02 ENCOUNTER — TRANSCRIPTION ENCOUNTER (OUTPATIENT)
Age: 48
End: 2023-05-02

## 2023-09-13 NOTE — PACU DISCHARGE NOTE - MENTAL STATUS: PATIENT PARTICIPATION
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 Surgeon: Yuni Ortega MD     Assistant: None. No qualified resident was available. Blood loss: minimal    Specimens: 18 gauge cores    Findings: Successful ultrasound guided core biopsy left pleural based mass. Complications: None immediate.     Anesthesia: conscious sedation
Awake

## 2024-05-01 ENCOUNTER — TRANSCRIPTION ENCOUNTER (OUTPATIENT)
Age: 49
End: 2024-05-01

## 2024-11-18 ENCOUNTER — TRANSCRIPTION ENCOUNTER (OUTPATIENT)
Age: 49
End: 2024-11-18

## 2024-12-16 ENCOUNTER — OFFICE (OUTPATIENT)
Dept: URBAN - METROPOLITAN AREA CLINIC 109 | Facility: CLINIC | Age: 49
Setting detail: OPHTHALMOLOGY
End: 2024-12-16
Payer: COMMERCIAL

## 2024-12-16 DIAGNOSIS — H52.13: ICD-10-CM

## 2024-12-16 DIAGNOSIS — H52.7: ICD-10-CM

## 2024-12-16 DIAGNOSIS — H43.393: ICD-10-CM

## 2024-12-16 PROCEDURE — 92250 FUNDUS PHOTOGRAPHY W/I&R: CPT | Performed by: OPHTHALMOLOGY

## 2024-12-16 PROCEDURE — 92004 COMPRE OPH EXAM NEW PT 1/>: CPT | Performed by: OPHTHALMOLOGY

## 2024-12-16 PROCEDURE — 92015 DETERMINE REFRACTIVE STATE: CPT | Performed by: OPHTHALMOLOGY

## 2024-12-16 ASSESSMENT — REFRACTION_AUTOREFRACTION
OD_SPHERE: -1.00
OS_AXIS: 172
OS_SPHERE: -0.75
OS_CYLINDER: -0.50
OD_CYLINDER: SPH

## 2024-12-16 ASSESSMENT — KERATOMETRY
OS_K1POWER_DIOPTERS: 43.25
OD_AXISANGLE_DEGREES: 075
OD_K2POWER_DIOPTERS: 44.50
OS_K2POWER_DIOPTERS: 44.50
OD_K1POWER_DIOPTERS: 43.75
OS_AXISANGLE_DEGREES: 093

## 2024-12-16 ASSESSMENT — REFRACTION_MANIFEST
OD_SPHERE: -1.00
OS_SPHERE: -0.50

## 2024-12-16 ASSESSMENT — CONFRONTATIONAL VISUAL FIELD TEST (CVF)
OD_FINDINGS: FULL
OS_FINDINGS: FULL

## 2024-12-16 ASSESSMENT — VISUAL ACUITY
OS_BCVA: 20/40+2
OD_BCVA: 20/25

## 2024-12-16 ASSESSMENT — TONOMETRY
OD_IOP_MMHG: 16
OS_IOP_MMHG: 16

## 2024-12-18 ENCOUNTER — NON-APPOINTMENT (OUTPATIENT)
Age: 49
End: 2024-12-18